# Patient Record
Sex: MALE | Race: ASIAN | Employment: FULL TIME
[De-identification: names, ages, dates, MRNs, and addresses within clinical notes are randomized per-mention and may not be internally consistent; named-entity substitution may affect disease eponyms.]

---

## 2017-09-03 ENCOUNTER — HEALTH MAINTENANCE LETTER (OUTPATIENT)
Age: 22
End: 2017-09-03

## 2018-02-09 ENCOUNTER — OFFICE VISIT (OUTPATIENT)
Dept: FAMILY MEDICINE | Facility: CLINIC | Age: 23
End: 2018-02-09
Payer: COMMERCIAL

## 2018-02-09 VITALS
SYSTOLIC BLOOD PRESSURE: 125 MMHG | RESPIRATION RATE: 20 BRPM | WEIGHT: 155.8 LBS | DIASTOLIC BLOOD PRESSURE: 79 MMHG | HEART RATE: 72 BPM | OXYGEN SATURATION: 100 % | TEMPERATURE: 97.7 F | HEIGHT: 60 IN | BODY MASS INDEX: 30.59 KG/M2

## 2018-02-09 DIAGNOSIS — B30.9 ACUTE VIRAL CONJUNCTIVITIS OF LEFT EYE: ICD-10-CM

## 2018-02-09 DIAGNOSIS — H15.002 SCLERITIS OF LEFT EYE: Primary | ICD-10-CM

## 2018-02-09 DIAGNOSIS — Z23 IMMUNIZATION DUE: ICD-10-CM

## 2018-02-09 RX ORDER — MINERAL OIL AND WHITE PETROLATUM 30; 940 MG/G; MG/G
OINTMENT OPHTHALMIC
Qty: 1 TUBE | Refills: 0 | Status: SHIPPED | OUTPATIENT
Start: 2018-02-09 | End: 2020-08-10

## 2018-02-09 RX ORDER — CARBOXYMETHYLCELLULOSE SODIUM 5 MG/ML
1 SOLUTION/ DROPS OPHTHALMIC 3 TIMES DAILY PRN
Qty: 1 BOTTLE | Refills: 1 | Status: SHIPPED | OUTPATIENT
Start: 2018-02-09

## 2018-02-09 NOTE — PROGRESS NOTES
SUBJECTIVE:                                                    Beata Ramos is a 22 year old year old male who presents to clinic today for the following health issues:    Eye(s) Problem  Onset: ~1 week. Boss told him to come in.    Description:   Location: left  Pain: YES- Itching started ~1 week ago, then began having pain 1/10 starting 2-3 days ago  Redness: YES    Accompanying Signs & Symptoms:  Discharge/mattering: Yes, clear drainage only  Swelling: no   Visual changes: no   Fever: no   Nasal Congestion: no   Bothered by bright lights: no     History:   Trauma: no   Foreign body exposure: no   Sick contact exposoure: no    Precipitating factors:   Wearing contacts: no     Alleviating factors:  Improved by: none noted by patient    Therapies Tried and outcome: None    Patient is a new patient of this clinic.  ----------------------------------------------------------------------------------------------------------------------  Problem list and histories reviewed & adjusted, as indicated.    No previous past medical problems  Past Surgical History:   Procedure Laterality Date     TESTICLE SURGERY  about 10 years old    cryptorchidism, bilateral       Social History   Substance Use Topics     Smoking status: Never Smoker     Smokeless tobacco: Never Used      Comment: exposure to second hand smoke.     Alcohol use 2.4 oz/week     4 Standard drinks or equivalent per week     Family History   Problem Relation Age of Onset     Asthma Brother      CEREBROVASCULAR DISEASE Father      2007, passed away from stroke.     Asthma Mother      Hepatitis Mother      C.A.D. No family hx of      DIABETES No family hx of      Hypertension No family hx of      Allergies No family hx of      CANCER No family hx of      HEART DISEASE No family hx of      Psychotic Disorder No family hx of      Respiratory No family hx of      do not know grandparents health history         Current Outpatient Prescriptions   Medication Sig Dispense  "Refill     White Petrolatum-Mineral Oil (SYSTANE NIGHTTIME) OINT 1 bead of ointment placed on lower lid 1 Tube 0     carboxymethylcellulose (REFRESH PLUS) 0.5 % SOLN ophthalmic solution Place 1 drop Into the left eye 3 times daily as needed for dry eyes 1 Bottle 1     Allergies   Allergen Reactions     Nkda [No Known Drug Allergies]      ----------------------------------------------------------------------------------------------------------------------  ROS:  Constitutional, HEENT, cardiovascular, pulmonary, gi and gu systems are negative, except as otherwise noted.    OBJECTIVE:     /79  Pulse 72  Temp 97.7  F (36.5  C)  Resp 20  Ht 4' 9.25\" (145.4 cm)  Wt 155 lb 12.8 oz (70.7 kg)  SpO2 100%  BMI 33.42 kg/m2  Body mass index is 33.42 kg/(m^2).  GENERAL: healthy, alert and no distress  EYES: PERRL, EOMI, eyelids normal, and conjunctiva/corneas- conjunctival injection OS and left scleral injection. No obvious foreign body. Fundal exam appears normal, though limited do to lack of dilation. No light sensitivity noted.  HENT: ear canals and TM's normal, nose and mouth without ulcers or lesions  RESP: lungs clear to auscultation - no rales, rhonchi or wheezes  CV: regular rate and rhythm, normal S1 S2, no S3 or S4, no murmur, click or rub, no peripheral edema and peripheral pulses strong  MS: no gross musculoskeletal defects noted, no edema  SKIN: no suspicious lesions or rashes  LYMPH: no cervical or supraclavicular adenopathy    Diagnostic Test Results:  none     ASSESSMENT/PLAN:     Declined immunizations: Influenza due to Other, clinic out of vaccination.    (H15.002) Scleritis and conjunctivitis of left eye  Comment: No overt signs of glaucoma, iritis, or foreign body.  Vision normal.  Likely viral in origin.  Treat symptomatically as below.  Close follow-up in 1 week if not improving.  Patient's begins experiencing new symptoms or worsening pain should be seen sooner.  Patient understanding of " plan.  Plan:         - White Petrolatum-Mineral Oil (SYSTANE NIGHTTIME) OINT, carboxymethylcellulose QHS        - (REFRESH PLUS) 0.5 % SOLN ophthalmic solution TID PRN        - F/u in 1 week if not improving, sooner if worsening    (Z23) Immunization due  Plan: ADMIN VACCINE, INITIAL, TDAP VACCINE (BOOSTRIX)    Mehul Russ MD  [facility]    Precepted with: Mitchell Renee MD      Options for treatment and follow-up care were reviewed with the patient and/or guardian. Beata Rachel and/or guardian engaged in the decision making process and verbalized understanding of the options discussed and agreed with the final plan    This chart is completed utilizing dictation software; typos and/or incorrect word substitutions may unintentionally occur.

## 2018-02-09 NOTE — MR AVS SNAPSHOT
After Visit Summary   2/9/2018    Beata Ramos    MRN: [id number]           Patient Information     Date Of Birth          1995        Visit Information        Provider Department      2/9/2018 3:00 PM Mehul Russ MD [facility]        Today's Diagnoses     Acute viral conjunctivitis of both eyes    -  1    Scleritis of left eye          Care Instructions    Important Takeaway Points From This Visit:    I have given you eye drops to use 3 times a day as needed    Additionally, I have given you a prescription of ointment to apply to your eye lid at night time.    Warm packs 3 times a day applied may help as well for 15-20 minutes at a time    See us again in 1 week if it is not improving    Sooner if worsening      As always, please call with any questions or concerns. I look forward to seeing you again soon!    Take care,  Dr. Russ    Your current medication list is printed. Please keep this with you - it is helpful to bring this current list to any other medical appointments. It can also be helpful if you ever go to the emergency room or hospital.    If you had lab testing today we will call you with the results. The phone number we will call with your results is # [phone] (home) . If this is not the best number please call our clinic and change the number.    If you need any refills, please call your pharmacy and they will contact us.    If you have any further concerns or wish to schedule another appointment, please call our office at [phone] during normal business hours (8-5, M-F).    If you have urgent medical questions that cannot wait, you may call [phone] at any time of day.    If you have a medical emergency, please call [phone].    Thank you for coming to [facility].              Follow-ups after your visit        Who to contact     Please call your clinic at [phone] to:    Ask questions about your health    Make or cancel  "appointments    Discuss your medicines    Learn about your test results    Speak to your doctor            Additional Information About Your Visit        Care EveryWhere ID     This is your Care EveryWhere ID. This could be used by other organizations to access your [facility] medical records  [id number]        Your Vitals Were     Pulse Temperature Respirations Height Pulse Oximetry BMI (Body Mass Index)    72 97.7  F (36.5  C) 20 4' 9.25\" (145.4 cm) 100% 33.42 kg/m2       Blood Pressure from Last 3 Encounters:   02/09/18 125/79   04/10/13 126/80    Weight from Last 3 Encounters:   02/09/18 155 lb 12.8 oz (70.7 kg)   04/10/13 129 lb 9.6 oz (58.8 kg) (41 %)*     * Growth percentiles are based on [facility] 2-20 Years data.              Today, you had the following     No orders found for display         Today's Medication Changes          These changes are accurate as of 2/9/18  3:44 PM.  If you have any questions, ask your nurse or doctor.               Start taking these medicines.        Dose/Directions    carboxymethylcellulose 0.5 % Soln ophthalmic solution   Commonly known as:  REFRESH PLUS   Used for:  Acute viral conjunctivitis of both eyes, Scleritis of left eye   Started by:  Mehul Russ MD        Dose:  1 drop   Place 1 drop Into the left eye 3 times daily as needed for dry eyes   Quantity:  1 Bottle   Refills:  1       SYSTANE NIGHTTIME Oint   Used for:  Acute viral conjunctivitis of both eyes, Scleritis of left eye   Started by:  Mehul Russ MD        1 bead of ointment placed on lower lid   Quantity:  1 Tube   Refills:  0            Where to get your medicines      These medications were sent to [facility]/pharmacy #[id number] - [place], [place] - [address] [address]  [address], [place] [address]     Phone:  [phone]     carboxymethylcellulose 0.5 % Soln ophthalmic solution    SYSTANE NIGHTTIME Oint                Primary Care Provider Office Phone # Fax #    Mehul Russ, " [phone]       [facility] [address] [place] [address]        Equal Access to Services     JORGE LANGFORD : Steffen Lopez, louisatul garrison, gregoriomaricel zacariasdanisda jose jtasia, redd tamayo laJuliocesarsimon booker. So [facility] [phone].    ATENCIÓN: Si habla español, tiene a esquivel disposición servicios gratuitos de asistencia lingüística. Llame al [phone].    We comply with applicable federal civil rights laws and [place] laws. We do not discriminate on the basis of race, color, national origin, age, disability, sex, sexual orientation, or gender identity.            Thank you!     Thank you for choosing [facility]  for your care. Our goal is always to provide you with excellent care. Hearing back from our patients is one way we can continue to improve our services. Please take a few minutes to complete the written survey that you may receive in the mail after your visit with us. Thank you!             Your Updated Medication List - Protect others around you: Learn how to safely use, store and throw away your medicines at www.disposemymeds.org.          This list is accurate as of 2/9/18  3:44 PM.  Always use your most recent med list.                   Brand Name Dispense Instructions for use Diagnosis    carboxymethylcellulose 0.5 % Soln ophthalmic solution    REFRESH PLUS    1 Bottle    Place 1 drop Into the left eye 3 times daily as needed for dry eyes    Acute viral conjunctivitis of both eyes, Scleritis of left eye       SYSTANE NIGHTTIME Oint     1 Tube    1 bead of ointment placed on lower lid    Acute viral conjunctivitis of both eyes, Scleritis of left eye

## 2018-02-09 NOTE — LETTER
RETURN TO WORK/SCHOOL FORM    2/9/2018    Re: Beata Ramos  1995      To Whom It May Concern:     Beata Ramos was seen in clinic today. He may return to work without restrictions on 2/10/18          Restrictions:  None      Mehul Russ MD  2/9/2018 3:50 PM

## 2018-02-09 NOTE — PROGRESS NOTES
Preceptor Attestation:  Patient's case reviewed and discussed with Mehul Russ MD Patient seen and discussed with the resident.. I agree with assessment and plan of care.  Supervising Physician:  Mitchell Renee MD  [facility]

## 2018-02-09 NOTE — PATIENT INSTRUCTIONS
Important Takeaway Points From This Visit:    I have given you eye drops to use 3 times a day as needed    Additionally, I have given you a prescription of ointment to apply to your eye lid at night time.    Warm packs 3 times a day applied may help as well for 15-20 minutes at a time    See us again in 1 week if it is not improving    Sooner if worsening      As always, please call with any questions or concerns. I look forward to seeing you again soon!    Take care,  Dr. Russ    Your current medication list is printed. Please keep this with you - it is helpful to bring this current list to any other medical appointments. It can also be helpful if you ever go to the emergency room or hospital.    If you had lab testing today we will call you with the results. The phone number we will call with your results is # [phone] (home) . If this is not the best number please call our clinic and change the number.    If you need any refills, please call your pharmacy and they will contact us.    If you have any further concerns or wish to schedule another appointment, please call our office at [phone] during normal business hours (8-5, M-F).    If you have urgent medical questions that cannot wait, you may call [phone] at any time of day.    If you have a medical emergency, please call [phone].    Thank you for coming to [facility].

## 2018-02-21 PROBLEM — E66.811 OBESITY (BMI 30.0-34.9): Status: ACTIVE | Noted: 2018-02-21

## 2020-08-03 ENCOUNTER — OFFICE VISIT (OUTPATIENT)
Dept: FAMILY MEDICINE | Facility: CLINIC | Age: 25
End: 2020-08-03
Payer: COMMERCIAL

## 2020-08-03 VITALS
RESPIRATION RATE: 18 BRPM | DIASTOLIC BLOOD PRESSURE: 79 MMHG | SYSTOLIC BLOOD PRESSURE: 128 MMHG | TEMPERATURE: 98.5 F | BODY MASS INDEX: 30.63 KG/M2 | WEIGHT: 156 LBS | HEIGHT: 60 IN | HEART RATE: 83 BPM | OXYGEN SATURATION: 99 %

## 2020-08-03 DIAGNOSIS — G52.9 CRANIAL NERVE PALSY: Primary | ICD-10-CM

## 2020-08-03 RX ORDER — PREDNISONE 20 MG/1
60 TABLET ORAL
Qty: 21 TABLET | Refills: 0 | Status: SHIPPED | OUTPATIENT
Start: 2020-08-03 | End: 2020-08-10

## 2020-08-03 RX ORDER — POLYETHYLENE GLYCOL, PROPYLENE GLYCOL .4; .3 G/100ML; G/100ML
1 LIQUID OPHTHALMIC
Qty: 30 ML | Refills: 1 | Status: SHIPPED | OUTPATIENT
Start: 2020-08-03

## 2020-08-03 ASSESSMENT — MIFFLIN-ST. JEOR: SCORE: 1518.36

## 2020-08-03 NOTE — PROGRESS NOTES
"S: Beata is a 24 y/o male with family history of stroke in his father who presents with left sided facial numbness and weakness. Patient states that beginning on Friday he began having tongue numbness then Saturday when he woke up he noticed slurred speech, left facial weakness, and pain in neck and behind ear. This has not worsened or improved since that time. He notes associated left ear tearing, and left ear pain but denies headache, walking issues, hearing or vision concerns, fever, rash, cough, or changes to bowel movement or urination. No known sick contacts. He is unsure whether he had chicken pox as a child.     O:   Vital signs:  Temp: 98.5  F (36.9  C) Temp src: Oral BP: 128/79 Pulse: 83   Resp: 18 SpO2: 99 %     Height: 147.3 cm (4' 10\") Weight: 70.8 kg (156 lb)  Estimated body mass index is 32.6 kg/m  as calculated from the following:    Height as of this encounter: 1.473 m (4' 10\").    Weight as of this encounter: 70.8 kg (156 lb).       Exam:  Constitutional: healthy, alert and no distress  Head: Normocephalic. No masses, lesions, tenderness or abnormalities  Neck: Neck supple. No adenopathy. Thyroid symmetric, normal size,, Carotids without bruits.  ENT: ENT exam normal, no neck nodes or sinus tenderness, bilateral TM normal without fluid or infection, neck without nodes and throat normal without erythema or exudate  Cardiovascular: negative, PMI normal. No lifts, heaves, or thrills. RRR. No murmurs, clicks gallops or rub  Respiratory: negative. Good diaphragmatic excursion. Lungs clear  Musculoskeletal: extremities normal- no gross deformities noted, gait normal and normal muscle tone  Skin: no suspicious lesions or rashes  Neurologic: Gait normal. Sensation grossly WNL., CN 2-6, 8-12 intact. negative findings: mental status intact, gait, including heel, toe, muscle tone normal, muscle strength normal, rapid alternating movements normal, sensation to light touch and pinprick normal, positive findings: " left side mouth does not raise with smile. Unable to keep left eye shut against pulling open. Left eyebrow does not raise when asked. Right CN-7 intact.   Psychiatric: mentation appears normal and affect normal/bright  Hematologic/Lymphatic/Immunologic: Normal cervical lymph nodes      A/P: (G52.9) Cranial nerve palsy  (primary encounter diagnosis)  Beata is a 24 y/o male with family history of stroke in father who presents with left sided facial weakness beginning Saturday (8/1). Patient reports tongue numbness, left facial weakness, slurred speech, and left ear ringing. Rest of ROS negative. Upon arrival to clinic, patient was vitally stable and normotensive. Exam remarkable for left sided CN-7 weakness. Rest of exam unremarkable. Presentation likely cranial nerve palsy likely secondary to a viral infection/reactivation. Presentation and exam inconsistent with stroke. Reassured patient at this time.    Plan: predniSONE (DELTASONE) 20 MG tablet,         polyethylene glycol-propylene glycol         (LUBRICATING EYE DROPS) 0.4-0.3 % SOLN         ophthalmic solution      Hetal Stokes MS4    Resident/Fellow Attestation   I, Guerrero Stewart, was present with the medical student who participated in the service and in the documentation of the note.  I have verified the history and personally performed the physical exam and medical decision making.  I agree with the assessment and plan of care as documented in the note.      Guerrero Stewart MD  PGY3    Preceptor was Dr. Scott

## 2020-08-10 ENCOUNTER — OFFICE VISIT (OUTPATIENT)
Dept: FAMILY MEDICINE | Facility: CLINIC | Age: 25
End: 2020-08-10
Payer: COMMERCIAL

## 2020-08-10 VITALS
WEIGHT: 156 LBS | HEIGHT: 60 IN | SYSTOLIC BLOOD PRESSURE: 129 MMHG | RESPIRATION RATE: 18 BRPM | TEMPERATURE: 98.3 F | HEART RATE: 81 BPM | DIASTOLIC BLOOD PRESSURE: 88 MMHG | BODY MASS INDEX: 30.63 KG/M2

## 2020-08-10 DIAGNOSIS — G52.9 CRANIAL NERVE PALSY: Primary | ICD-10-CM

## 2020-08-10 RX ORDER — MINERAL OIL AND WHITE PETROLATUM 30; 940 MG/G; MG/G
1 OINTMENT OPHTHALMIC AT BEDTIME
Qty: 3.5 G | Refills: 3 | Status: SHIPPED | OUTPATIENT
Start: 2020-08-10

## 2020-08-10 ASSESSMENT — MIFFLIN-ST. JEOR: SCORE: 1518.36

## 2020-08-10 NOTE — PROGRESS NOTES
"       HPI       Beata Ramos is a 25 year old male who presents for follow-up for facial nerve palsy. He was seen for symptoms of left sided facial weakness and tongue numbness on 8/3, and was prescribed 60 mg of prednisone daily for a week. In clinic today, he feels that his symptoms are improving slightly, but still bothersome. He reports that he is able to move his left cheek more, but his eye and lip symptoms are unchanged. He was prescribed eye drops at his last visit and was told to tape his eye shut at night, and he has been doing that.     Problem, Medication and Allergy Lists were reviewed and updated if needed.    Patient is an established patient of this clinic..         Physical Exam:     Vitals:    08/10/20 1601   BP: 129/88   Pulse: 81   Resp: 18   Temp: 98.3  F (36.8  C)   TempSrc: Oral   Weight: 70.8 kg (156 lb)   Height: 1.473 m (4' 10\")     Body mass index is 32.6 kg/m .  Vitals were reviewed and were normal     Physical exam  General: well appearing male in no acute distress  Head: normocephalic and atraumatic without masses or lesions  ENT: EOM intact, PERRL. Left eye does not close completely and left eyebrow does not move. Corner of left lip is numb and turned down. Patient is able to puff cheeks. Sensation is intact bilaterally. Able to move tongue.         Results:   No labs performed.      Assessment and Plan      Beata Ramos is a 26 yo male with no significant PMH presenting for follow-up for facial nerve palsy.     CN-7 Palsy  Patient has completed his seven day course of prednisone. His symptoms are persisting, but improving slightly. Patient understands that it will take weeks or months for the nerves to recover from the insult.   - Continue lubricating eye drops   - Start using lubricating ointment at night with eye tape  - Follow-up in one month to monitor for improvement      Options for treatment and follow-up care were reviewed with the patient. Beata Ramos  engaged in the decision making " process and verbalized understanding of the options discussed and agreed with the final plan.    Kamilah Portillo, MS4    Resident/Fellow Attestation   I, Guerrero Stewart, was present with the medical student who participated in the service and in the documentation of the note.  I have verified the history and personally performed the physical exam and medical decision making.  I agree with the assessment and plan of care as documented in the note.      Guerrero Stewart MD  PGY3    Preceptor was Dr. Scott

## 2020-08-10 NOTE — PROGRESS NOTES
Preceptor Attestation:  Patient seen, examined, and discussed with the resident..  I agree with written assessment and plan of care.  Supervising Physician:  Dulce Scott MD  [facility]

## 2020-08-10 NOTE — LETTER
RETURN TO WORK/SCHOOL FORM    8/10/2020    Re: Beata Ramos  1995      To Whom It May Concern:     Beata Ramos was seen in clinic today..  He may return to work without restrictions on 8/11/20.    Guerrero Stewart MD  8/10/2020 4:46 PM

## 2020-09-18 ENCOUNTER — OFFICE VISIT (OUTPATIENT)
Dept: FAMILY MEDICINE | Facility: CLINIC | Age: 25
End: 2020-09-18
Payer: COMMERCIAL

## 2020-09-18 VITALS
WEIGHT: 153 LBS | RESPIRATION RATE: 20 BRPM | BODY MASS INDEX: 30.04 KG/M2 | TEMPERATURE: 98.3 F | OXYGEN SATURATION: 100 % | HEIGHT: 60 IN | HEART RATE: 63 BPM | DIASTOLIC BLOOD PRESSURE: 73 MMHG | SYSTOLIC BLOOD PRESSURE: 112 MMHG

## 2020-09-18 DIAGNOSIS — G52.9 CRANIAL NERVE PALSY: Primary | ICD-10-CM

## 2020-09-18 ASSESSMENT — MIFFLIN-ST. JEOR: SCORE: 1499.62

## 2020-09-18 NOTE — NURSING NOTE
"Chief Complaint   Patient presents with     Follow Up     bell's palsy/facial numbness- much better now.      Medication Reconciliation     completed.        /73 (BP Location: Right arm, Patient Position: Sitting, Cuff Size: Adult Regular)   Pulse 63   Temp 98.3  F (36.8  C) (Oral)   Resp 20   Ht 1.465 m (4' 9.68\")   Wt 69.4 kg (153 lb)   SpO2 100%   BMI 32.34 kg/m      BP Recheck if applicable: NA      ~ Masoud Piedra CMA ([place])  [facility]  Phone: [phone].   "

## 2020-09-18 NOTE — PROGRESS NOTES
HPI:       Beata Ramos is a 25 year old  male with a significant past medical history of cranial nerve 7 palsy  who presents for follow up of concern(s) listed below    1) cranial nerve palsy f/u  -Presented to clinic in beginning of August  -Diagnosed and treated with prednisone  -Follow up visit -> small improvement  -Second follow up visit today  -Completely resolved  -occasional tingling in left face         PMHX:     Patient Active Problem List   Diagnosis     Obesity (BMI 30.0-34.9)       Past Surgical History:   Procedure Laterality Date     TESTICLE SURGERY  about 10 years old    cryptorchidism, bilateral       Current Outpatient Medications   Medication Sig Dispense Refill     carboxymethylcellulose (REFRESH PLUS) 0.5 % SOLN ophthalmic solution Place 1 drop Into the left eye 3 times daily as needed for dry eyes 1 Bottle 1     polyethylene glycol-propylene glycol (LUBRICATING EYE DROPS) 0.4-0.3 % SOLN ophthalmic solution Place 1 drop into both eyes every hour as needed for dry eyes 30 mL 1     White Petrolatum-Mineral Oil (SYSTANE NIGHTTIME) OINT Apply 1 g to eye At Bedtime 3.5 g 3          Allergies   Allergen Reactions     Nkda [No Known Drug Allergies]        Social History     Socioeconomic History     Marital status: Single     Spouse name: Not on file     Number of children: Not on file     Years of education: Not on file     Highest education level: Not on file   Occupational History     Not on file   Social Needs     Financial resource strain: Not on file     Food insecurity     Worry: Not on file     Inability: Not on file     Transportation needs     Medical: Not on file     Non-medical: Not on file   Tobacco Use     Smoking status: Current Every Day Smoker     Smokeless tobacco: Never Used     Tobacco comment: Currently vaping   Substance and Sexual Activity     Alcohol use: Yes     Alcohol/week: 4.0 standard drinks     Types: 4 Standard drinks or equivalent per week     Comment: Occasionally  "    Drug use: No     Sexual activity: Not on file   Lifestyle     Physical activity     Days per week: Not on file     Minutes per session: Not on file     Stress: Not on file   Relationships     Social connections     Talks on phone: Not on file     Gets together: Not on file     Attends Restorationism service: Not on file     Active member of club or organization: Not on file     Attends meetings of clubs or organizations: Not on file     Relationship status: Not on file     Intimate partner violence     Fear of current or ex partner: Not on file     Emotionally abused: Not on file     Physically abused: Not on file     Forced sexual activity: Not on file   Other Topics Concern     Parent/sibling w/ CABG, MI or angioplasty before 65F 55M? Not Asked   Social History Narrative    Patient works as a  making gift cards.              Review of Systems:     3-point ROS reviewed and negative unless otherwise noted in HPI            Physical Exam:     Vitals:    09/18/20 0859   BP: 112/73   BP Location: Right arm   Patient Position: Sitting   Cuff Size: Adult Regular   Pulse: 63   Resp: 20   Temp: 98.3  F (36.8  C)   TempSrc: Oral   SpO2: 100%   Weight: 69.4 kg (153 lb)   Height: 1.465 m (4' 9.68\")     Body mass index is 32.34 kg/m .    GENERAL: healthy, alert and no distress  EYES: EOMI,  Pupils equal, conjunctiva normal  HENT: nose and mouth without ulcers or lesions  NECK: no asymmetry, masses, or scars  RESP: breathing and speaking comfortably, normal RR  CV: acyanotic  MS: extremities normal- no gross deformities noted  SKIN: no suspicious lesions or rashes  NEURO: A&Ox3.  CN II-XII intact, light touch and motor functions intact, gait normal  PSYCH: affect/mood appropriate      Assessment and Plan     (G52.9) Cranial nerve palsy  (primary encounter diagnosis)  Comment: Improved and resolved.  Follow up as needed.  Plan:   -Reassurance      Options for treatment and follow-up care were reviewed with the " patient and/or guardian. Beata Rachel and/or guardian engaged in the decision making process and verbalized understanding of the options discussed and agreed with the final plan.      Guerrero Stewart MD    Precepted today with: Jacoby Mills MD

## 2020-09-18 NOTE — PROGRESS NOTES
I have personally reviewed the history and examination as documented by Dr. Stewart.  I was present during key portions of the visit and agree with the assessment and plan as documented for 25 yr old male with facial nerve palsy here for follow-up. Symptoms have resolved. Precautions given. Anticipatory guidance given.     Jacoby Mills MD  September 18, 2020  9:17 AM

## 2021-06-03 ENCOUNTER — RECORDS - HEALTHEAST (OUTPATIENT)
Dept: ADMINISTRATIVE | Facility: CLINIC | Age: 26
End: 2021-06-03